# Patient Record
Sex: FEMALE | Race: WHITE | URBAN - METROPOLITAN AREA
[De-identification: names, ages, dates, MRNs, and addresses within clinical notes are randomized per-mention and may not be internally consistent; named-entity substitution may affect disease eponyms.]

---

## 2017-12-28 ENCOUNTER — THERAPY VISIT (OUTPATIENT)
Dept: PHYSICAL THERAPY | Facility: CLINIC | Age: 27
End: 2017-12-28

## 2017-12-28 DIAGNOSIS — M79.671 RIGHT FOOT PAIN: ICD-10-CM

## 2017-12-28 DIAGNOSIS — R26.9 ABNORMAL GAIT: ICD-10-CM

## 2017-12-28 DIAGNOSIS — S92.901D CLOSED FRACTURE OF RIGHT FOOT WITH ROUTINE HEALING, SUBSEQUENT ENCOUNTER: Primary | ICD-10-CM

## 2017-12-28 PROCEDURE — 97110 THERAPEUTIC EXERCISES: CPT | Mod: GP | Performed by: PHYSICAL THERAPIST

## 2017-12-28 PROCEDURE — 97161 PT EVAL LOW COMPLEX 20 MIN: CPT | Mod: GP | Performed by: PHYSICAL THERAPIST

## 2017-12-28 ASSESSMENT — ACTIVITIES OF DAILY LIVING (ADL)
ACTIVITIES_OF_DAILY_LIVING_MEASURE_SCORE(%):: 40
HIGHEST_POTENTIAL_ADL_SCORE:: 80
TOTAL_ADL_ITEM_SCORE:: 32

## 2017-12-28 NOTE — LETTER
[facility] ATHLETIC [facility] PHYSICAL [facility]  [address]  [facility] [id number] [phone]    2017    Re: Elaine Mcdonald   :   1990  MRN:  [id number]   REFERRING PHYSICIAN:   Kg Mello    [facility]    Date of Initial Evaluation:  2017  Visits:  Rxs Used: 1  Reason for Referral:     Closed fracture of right foot with routine healing, subsequent encounter  Right foot pain  Abnormal gait    EVALUATION SUMMARY    [facility] Initial Evaluation  Subjective:  Patient is a 27 year old female presenting with rehab right ankle/foot hpi. The history is provided by the patient. No  was used.   Elaine Mcdonald is a 27 year old female with a right ankle condition.  Condition occurred with:  A fall/slip.  Condition occurred: in the community.  This is a chronic condition  2017: ran and jumped onto boyfriend while on sandbar and he fell backwards, both of them landed on her right foot. X-ray showed lisfranc fracture (7 bones fractured) . Was in boot from August to 2017. .    Patient reports pain:  Metatarsal heads and longitudinal arch (forefoot).  Radiates to:  Knee.  Pain is described as aching and sharp and is constant and reported as 4/10 (4/10 today at rest; 9/10 w/ activity).  Associated symptoms:  Loss of motion/stiffness, loss of strength and edema. Pain is worse in the P.M..  Symptoms are exacerbated by weight bearing, walking, other, ascending stairs and descending stairs (avoiding running) and relieved by bracing/immobilizing, rest, ice and analgesics.  Since onset symptoms are gradually worsening.  Special tests:  X-ray (most recent 17).      General health as reported by patient is good.    Medical allergies: no.  Other surgeries include:  None reported.  Current medications:  Pain medication.  Current occupation is Warehouse receiving; lives in  basement of house w/ boyfriend, has 2 roommates, also has 2 dogs, 3 cats, and 7 chickens..  Patient is working in normal job with restrictions.  Primary job tasks include:  Repetitive tasks and other (computer work). Barriers include:  Stairs and bathroom/bedroom on second floor. Red flags:  None as reported by patient.    Objective:  Gait:    Gait Type:  Antalgic   Weight Bearing Status:  WBAT   Assistive Devices:  None  Ankle/Foot Evaluation  ROM:    AROM:    Dorsiflexion:  Left:   (KE) 10, (KF) 10  Right:   (KE) lacking 10 ++pain, (KF) 0 +pain  Plantarflexion:  Left:  70    Right:  55  Inversion:  Left:  40     Right:  30 +pain  Eversion:  30     Right:  Lacking 4 +++pain  Great toe flexion:  Left:  30     Right:  Lacking 10 ++pain  Great Toe Extension:  Left:  65     Right: 60 +pain  PROM:    Inversion: Left:          Right:   40 +++pain  Eversion: Left:      Right:  10 +++pain  Strength:    Dorsiflexion:  Left: 5/5     Pain:   Right: 4/5    Pain:++  Plantarflexion: Left: 5/5   Pain:   Right: 3/5   Pain:+++  Inversion:Left: 5/5  Pain:     Right: 4+/5   Pain:+  Eversion:Left: 5/5  Pain:  Right: 3+/5   Pain:+++  Strength wnl ankle: pain w/ strength testing mostly due to pressure through forefoot.  PALPATION: Palpation of ankle: tenderness all metatarsals (worst proximal)  Right ankle tenderness present at:   achilles tendon and lateral malleolus  EDEMA:   Left ankle edema present at: 49 cmForefoot left ankle: 21 cm.  Right ankle edema present at:  forefoot (22.5 cm) and 50 cm      Figure 8 left: 49 cmFigure 8 right: 50 cm       Assessment/Plan:    Patient is a 27 year old female with right side foot complaints.    Patient has the following significant findings with corresponding treatment plan.                Diagnosis 1:  S/P R Lisfranc fracture  Pain -  hot/cold therapy, manual therapy, splint/taping/bracing/orthotics, self management, education and home program  Decreased ROM/flexibility - manual therapy,  therapeutic exercise, therapeutic activity and home program  Decreased strength - therapeutic exercise, therapeutic activities and home program  Impaired balance - neuro re-education, therapeutic activities and home program  Decreased proprioception - neuro re-education, therapeutic activities and home program  Edema - cold therapy and self management/home program  Impaired gait - gait training and home program  Impaired muscle performance - neuro re-education and home program  Decreased function - therapeutic activities and home program    Therapy Evaluation Codes:   1) History comprised of:   Personal factors that impact the plan of care:      Past/current experiences, Profession and Time since onset of symptoms.    Comorbidity factors that impact the plan of care are:      None.     Medications impacting care: Pain.  2) Examination of Body Systems comprised of:   Body structures and functions that impact the plan of care:      Ankle and foot.   Activity limitations that impact the plan of care are:      Driving, Jumping, Running, Stairs, Standing, Walking and Working.  3) Clinical presentation characteristics are:   Stable/Uncomplicated.  4) Decision-Making    Low complexity using standardized patient assessment instrument and/or measureable assessment of functional outcome.  Cumulative Therapy Evaluation is: Low complexity.    Previous and current functional limitations:  (See Goal Flow Sheet for this information)    Short term and Long term goals: (See Goal Flow Sheet for this information)     Communication ability:  Patient appears to be able to clearly communicate and understand verbal and written communication and follow directions correctly.  Treatment Explanation - The following has been discussed with the patient:   RX ordered/plan of care  Anticipated outcomes  Possible risks and side effects  This patient would benefit from PT intervention to resume normal activities.   Rehab potential is  good.    Frequency:  1 X week, once daily  Duration:  for 4 weeks tapering to 2 X a month for 2 months  Discharge Plan:  Achieve all LTG.  Independent in home treatment program.  Reach maximal therapeutic benefit.    Thank you for your referral.    INQUIRIES  Therapist: Amanda Hilligoss, PT, DPT  [facility]  [address] [place] [address]. [phone]  [facility] [address]  Phone: [phone]  Fax: [phone]

## 2017-12-28 NOTE — PROGRESS NOTES
[facility] Initial Evaluation  Subjective:  Patient is a 27 year old female presenting with rehab right ankle/foot hpi. The history is provided by the patient. No  was used.   Elaine Mcdonald is a 27 year old female with a right ankle condition.  Condition occurred with:  A fall/slip.  Condition occurred: in the community.  This is a chronic condition  July 2017: ran and jumped onto boyfriend while on sandbar and he fell backwards, both of them landed on her right foot. X-ray showed lisfranc fracture (7 bones fractured) . Was in boot from August to November 2017. .    Patient reports pain:  Metatarsal heads and longitudinal arch (forefoot).  Radiates to:  Knee.  Pain is described as aching and sharp and is constant and reported as 4/10 (4/10 today at rest; 9/10 w/ activity).  Associated symptoms:  Loss of motion/stiffness, loss of strength and edema. Pain is worse in the P.M..  Symptoms are exacerbated by weight bearing, walking, other, ascending stairs and descending stairs (avoiding running) and relieved by bracing/immobilizing, rest, ice and analgesics.  Since onset symptoms are gradually worsening.  Special tests:  X-ray (most recent 12/13/17).      General health as reported by patient is good.    Medical allergies: no.  Other surgeries include:  None reported.  Current medications:  Pain medication.  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  Patient is working in normal job with restrictions.  Primary job tasks include:  Repetitive tasks and other (computer work).    Barriers include:  Stairs and bathroom/bedroom on second floor.    Red flags:  None as reported by patient.                        Objective:    Gait:    Gait Type:  Antalgic   Weight Bearing Status:  WBAT   Assistive Devices:  None            Ankle/Foot Evaluation  ROM:    AROM:    Dorsiflexion:  Left:    (KE) 10, (KF) 10  Right:   (KE) lacking 10 ++pain, (KF) 0 +pain  Plantarflexion:  Left:  70    Right:  55  Inversion:  Left:  40     Right:  30 +pain  Eversion:  30     Right:  Lacking 4 +++pain  Great toe flexion:  Left:  30     Right:  Lacking 10 ++pain  Great Toe Extension:  Left:  65     Right: 60 +pain  PROM:        Inversion: Left:          Right:   40 +++pain  Eversion: Left:      Right:  10 +++pain          Strength:    Dorsiflexion:  Left: 5/5     Pain:   Right: 4/5    Pain:++  Plantarflexion: Left: 5/5   Pain:   Right: 3/5   Pain:+++  Inversion:Left: 5/5  Pain:     Right: 4+/5   Pain:+  Eversion:Left: 5/5  Pain:  Right: 3+/5   Pain:+++              Strength wnl ankle: pain w/ strength testing mostly due to pressure through forefoot.      PALPATION: Palpation of ankle: tenderness all metatarsals (worst proximal)    Right ankle tenderness present at:   achilles tendon and lateral malleolus  EDEMA:   Left ankle edema present at: 49 cmForefoot left ankle: 21 cm.  Right ankle edema present at:  forefoot (22.5 cm) and 50 cm      Figure 8 left: 49 cmFigure 8 right: 50 cm                                                      General     ROS    Assessment/Plan:    Patient is a 27 year old female with right side foot complaints.    Patient has the following significant findings with corresponding treatment plan.                Diagnosis 1:  S/P R Lisfranc fracture  Pain -  hot/cold therapy, manual therapy, splint/taping/bracing/orthotics, self management, education and home program  Decreased ROM/flexibility - manual therapy, therapeutic exercise, therapeutic activity and home program  Decreased strength - therapeutic exercise, therapeutic activities and home program  Impaired balance - neuro re-education, therapeutic activities and home program  Decreased proprioception - neuro re-education, therapeutic activities and home program  Edema - cold therapy and self management/home program  Impaired gait - gait training  and home program  Impaired muscle performance - neuro re-education and home program  Decreased function - therapeutic activities and home program    Therapy Evaluation Codes:   1) History comprised of:   Personal factors that impact the plan of care:      Past/current experiences, Profession and Time since onset of symptoms.    Comorbidity factors that impact the plan of care are:      None.     Medications impacting care: Pain.  2) Examination of Body Systems comprised of:   Body structures and functions that impact the plan of care:      Ankle and foot.   Activity limitations that impact the plan of care are:      Driving, Jumping, Running, Stairs, Standing, Walking and Working.  3) Clinical presentation characteristics are:   Stable/Uncomplicated.  4) Decision-Making    Low complexity using standardized patient assessment instrument and/or measureable assessment of functional outcome.  Cumulative Therapy Evaluation is: Low complexity.    Previous and current functional limitations:  (See Goal Flow Sheet for this information)    Short term and Long term goals: (See Goal Flow Sheet for this information)     Communication ability:  Patient appears to be able to clearly communicate and understand verbal and written communication and follow directions correctly.  Treatment Explanation - The following has been discussed with the patient:   RX ordered/plan of care  Anticipated outcomes  Possible risks and side effects  This patient would benefit from PT intervention to resume normal activities.   Rehab potential is good.    Frequency:  1 X week, once daily  Duration:  for 4 weeks tapering to 2 X a month for 2 months  Discharge Plan:  Achieve all LTG.  Independent in home treatment program.  Reach maximal therapeutic benefit.    Please refer to the daily flowsheet for treatment today, total treatment time and time spent performing 1:1 timed codes.       Pt did not return for any additional visits after initial  evaluation. As this patient failed to complete ordered appointments, she will be discharged from therapy at this time.

## 2017-12-28 NOTE — MR AVS SNAPSHOT
After Visit Summary   12/28/2017    Elaine Mcdonald    MRN: [id number]           Patient Information     Date Of Birth          1990        Visit Information        Provider Department      12/28/2017 10:00 AM Hilligoss, Amanda K, PT [facility] Physical Therapy        Today's Diagnoses     Closed fracture of right foot with routine healing, subsequent encounter    -  1    Right foot pain        Abnormal gait           Follow-ups after your visit        Your next 10 appointments already scheduled     Jan 05, 2018 12:30 PM CST   GENE Extremity with Amanda K Hilligoss, PT   [facility] Physical Therapy ([facility]  )    [address]  [facility] [address]   [phone]            Jan 12, 2018 12:30 PM CST   GENE Extremity with Amanda K Hilligoss, PT   [facility] Physical Therapy ([facility]  )    [address]  [facility] [address]   [phone]            Jan 19, 2018 12:30 PM CST   GENE Extremity with Amanda K Hilligoss, PT   [facility] Physical Therapy ([facility]  )    [address]  [facility] [address]   [phone]            Jan 26, 2018 12:30 PM CST   GENE Extremity with Amanda K Hilligoss, PT   [facility] Physical Therapy ([facility]  )    [address]  [facility] [address]   [phone]              Who to contact     If you have questions or need follow up information about today's clinic visit or your schedule please contact [place] [facility] PHYSICAL THERAP[facility] directly at [phone].  Normal or non-critical lab and imaging results will be communicated to you by MyChart, letter or phone within 4 business days after the clinic has received the results. If you do not hear from us within 7 days, please contact the clinic through  "MyChart or phone. If you have a critical or abnormal lab result, we will notify you by phone as soon as possible.  Submit refill requests through [employer] or call your pharmacy and they will forward the refill request to us. Please allow 3 business days for your refill to be completed.          Additional Information About Your Visit        [employer] Information     Firebase lets you send messages to your doctor, view your test results, renew your prescriptions, schedule appointments and more. To sign up, go to [email or web address] . Click on \"Log in\" on the left side of the screen, which will take you to the Welcome page. Then click on \"Sign up Now\" on the right side of the page.     You will be asked to enter the access code listed below, as well as some personal information. Please follow the directions to create your username and password.     Your access code is: [email or web address]  Expires: 3/28/2018  1:20 PM     Your access code will  in 90 days. If you need help or a new code, please call your [place] clinic or [phone].        Care EveryWhere ID     This is your Care EveryWhere ID. This could be used by other organizations to access your [facility] medical records  [id number]         Blood Pressure from Last 3 Encounters:   No data found for BP    Weight from Last 3 Encounters:   No data found for Wt              We Performed the Following     HC PT EVAL, LOW COMPLEXITY     GENE INITIAL EVAL REPORT     THERAPEUTIC EXERCISES        Primary Care Provider    None Specified       No primary provider on file.        Equal Access to Services     CHERI GARZON : Hadii mac Smith, watomda carlos, qaybta kaalmada sheila ferrer . So [facility] [phone].    ATENCIÓN: Si habla español, tiene a zamora disposición servicios gratuitos de asistencia lingüística. Llame al [phone].    We comply with applicable federal civil rights laws and [place] laws. We do not " discriminate on the basis of race, color, national origin, age, disability, sex, sexual orientation, or gender identity.            Thank you!     Thank you for choosing [facility] PHYS[facility]  for your care. Our goal is always to provide you with excellent care. Hearing back from our patients is one way we can continue to improve our services. Please take a few minutes to complete the written survey that you may receive in the mail after your visit with us. Thank you!             Your Updated Medication List - Protect others around you: Learn how to safely use, store and throw away your medicines at www.disposemymeds.org.      Notice  As of 12/28/2017  1:20 PM    You have not been prescribed any medications.

## 2018-06-08 PROBLEM — R26.9 ABNORMAL GAIT: Status: RESOLVED | Noted: 2017-12-28 | Resolved: 2018-06-08

## 2018-06-08 PROBLEM — M79.671 RIGHT FOOT PAIN: Status: RESOLVED | Noted: 2017-12-28 | Resolved: 2018-06-08
